# Patient Record
Sex: FEMALE | Race: WHITE | Employment: FULL TIME | URBAN - METROPOLITAN AREA
[De-identification: names, ages, dates, MRNs, and addresses within clinical notes are randomized per-mention and may not be internally consistent; named-entity substitution may affect disease eponyms.]

---

## 2020-03-19 ENCOUNTER — OFFICE VISIT (OUTPATIENT)
Dept: FAMILY MEDICINE CLINIC | Age: 25
End: 2020-03-19
Payer: COMMERCIAL

## 2020-03-19 VITALS
RESPIRATION RATE: 16 BRPM | HEIGHT: 65 IN | BODY MASS INDEX: 32.92 KG/M2 | WEIGHT: 197.6 LBS | DIASTOLIC BLOOD PRESSURE: 82 MMHG | TEMPERATURE: 97.8 F | SYSTOLIC BLOOD PRESSURE: 110 MMHG | HEART RATE: 90 BPM | OXYGEN SATURATION: 98 %

## 2020-03-19 PROCEDURE — 99214 OFFICE O/P EST MOD 30 MIN: CPT | Performed by: FAMILY MEDICINE

## 2020-03-19 RX ORDER — NORGESTIMATE AND ETHINYL ESTRADIOL 7DAYSX3 28
KIT ORAL
COMMUNITY
Start: 2020-02-10 | End: 2022-06-21 | Stop reason: ALTCHOICE

## 2020-03-19 ASSESSMENT — PATIENT HEALTH QUESTIONNAIRE - PHQ9
1. LITTLE INTEREST OR PLEASURE IN DOING THINGS: 0
SUM OF ALL RESPONSES TO PHQ QUESTIONS 1-9: 0
SUM OF ALL RESPONSES TO PHQ9 QUESTIONS 1 & 2: 0
SUM OF ALL RESPONSES TO PHQ QUESTIONS 1-9: 0
2. FEELING DOWN, DEPRESSED OR HOPELESS: 0

## 2020-03-19 NOTE — PROGRESS NOTES
CC: Antonino Quinones is a 27 y.o. yo female here for evaluation of the following medical concerns: New Patient ([facility]) and Thyroid Problem (Had labs done at [facility], and T3 was elevated)      HPI:    Fatigue: about 1 year of worsening fatigue and weight gain with no changes in diet. Initially felt to be related to OCP so was off for some time and noticed no change. Currently on alternative OCP. Did get labs done at ob/gyn and was found to have elevated total T3 and normal TSH. No new palpitations, temperature intolerance, hot flashes, skin / hair pattern changes, fatigue, mood swings. History reviewed. No pertinent past medical history. Past Surgical History:   Procedure Laterality Date    WISDOM TOOTH EXTRACTION       Family History   Problem Relation Age of Onset    Thyroid Disease Maternal Grandmother     Allergies Sister     Other Sister         benign tumor of knee     Social History     Tobacco Use    Smoking status: Former Smoker     Packs/day: 0.25     Years: 3.00     Pack years: 0.75     Last attempt to quit: 3/19/2018     Years since quittin.0    Smokeless tobacco: Never Used   Substance Use Topics    Alcohol use: Yes     Comment: occasionally    Drug use: Never     No Known Allergies  Prior to Admission medications    Medication Sig Start Date End Date Taking?  Authorizing Provider   TRI FEMYNOR 0.18/0.215/0.25 MG-35 MCG TABS TAKE 1 TABLET BY MOUTH EVERY DAY AS DIRECTED 2/10/20  Yes Historical Provider, MD       ROS:  General: no new fever, chills, night sweats, weight changes      Ophthalmic: no new vision changes  ENT: no new headaches, sinus problems, URI symptoms  Cardiovascular: no new chest pain or dyspnea on exertion, palpitations  Respiratory: no new cough, shortness of breath  Gastrointestinal: no new abdominal pain, change in bowel habits, or black or bloody stools  Genito-Urinary: no new dysuria, trouble voiding, or hematuria  Endocrine: no new hot flashes, malaise/lethargy, polydipsia/polyuria, skin changes, temperature intolerance and unexpected weight changes   Musculoskeletal: no new  joint pain, muscle pain  Hematological and Lymphatic: no new bleeding problems  Dermatological: no new rash and skin lesion changes  Neurological: no new TIA or stroke symptoms  Psychological: no current depression or anxiety  Allergy and Immunology: no new nasal congestion, postnasal drip and seasonal allergies    Vitals:  /82   Pulse 90   Temp 97.8 °F (36.6 °C)   Resp 16   Ht 5' 5.25\" (1.657 m)   Wt 197 lb 9.6 oz (89.6 kg)   LMP 03/07/2020   SpO2 98%   BMI 32.63 kg/m²   Wt Readings from Last 3 Encounters:   03/19/20 197 lb 9.6 oz (89.6 kg)       Physical Exam:  Constitutional - alert, well appearing, and in no distress  Eyes - pupils equal and reactive, extraocular eye movements intact, left eye normal, right eye normal, no conjunctivitis noted  ENT - right ear normal, left ear normal; nose normal and patent, no erythema, discharge; mouth/throat mucous membranes moist, pharynx normal without lesions  Neck - supple, no significant adenopathy; thyroid exam: thyroid is normal in size without nodules or tenderness  Respiratory- clear to auscultation, no wheezes, rales or rhonchi, symmetric air entry; no increased work of breathing  Cardiovascular - normal rate, regular rhythm, normal S1, S2, no murmurs, rubs, clicks or gallops;  Extremities - peripheral pulses normal, no pedal edema, no clubbing or cyanosis  Abdomen - soft, nontender, nondistended, no masses or organomegaly  Musculoskeletal - gait normal; no clubbing, cyanosis of extremities noted; no joint deformity or swelling noted; full active range of motion noted without pain  Skin - normal coloration and turgor, no rashes, no suspicious skin lesions noted  Neurological - alert, oriented; no obvious CN deficits, normal speech, no focal findings or movement disorder noted  Psychiatric - alert, oriented to person, place, and time, normal mood, behavior, speech, dress, motor activity, and thought processes, affect appropriate to mood    Labs:  Pertinent labs, imaging, other diagnostic data and other clinician documentation reviewed in electronic medical record. Assessment / Plan   Diagnosis Orders   1. Encounter to establish care     2. Fatigue, unspecified type  Lipid Panel    CBC Auto Differential    Comprehensive Metabolic Panel    Vitamin D 25 Hydroxy    TSH without Reflex    T3    T4, FREE    THYROID PEROXIDASE ANTIBODY    THYROTROPIN RECEPTOR ANTIBODY    THYROGLOBULIN    HIV-1 AND HIV-2 ANTIBODIES   3. Weight gain  Lipid Panel    CBC Auto Differential    Comprehensive Metabolic Panel    Vitamin D 25 Hydroxy    TSH without Reflex    T3    T4, FREE   4. Thyroid function test abnormal   · Recheck labs. If still abnormal will refer to endo for further evaluation. TSH without Reflex    T3    T4, FREE    THYROID PEROXIDASE ANTIBODY    THYROTROPIN RECEPTOR ANTIBODY    THYROGLOBULIN   5. Healthcare maintenance  HIV-1 AND HIV-2 ANTIBODIES   6. Need for vaccination  INFLUENZA, QUADV, 3 YRS AND OLDER, IM PF, PREFILL SYR OR SDV, 0.5ML (AFLURIA QUADV, PF)     RTO: Return in about 3 months (around 6/19/2020) for preventative visit. Pt to bring / sign for medical records.      An electronic signature was used to authenticate this note.  ---- Shelia Kevin MD on 3/19/2020 at 9:42 AM

## 2020-07-24 LAB
ALBUMIN SERPL-MCNC: NORMAL G/DL
ALP BLD-CCNC: NORMAL U/L
ALT SERPL-CCNC: NORMAL U/L
ANION GAP SERPL CALCULATED.3IONS-SCNC: NORMAL MMOL/L
AST SERPL-CCNC: NORMAL U/L
BASOPHILS ABSOLUTE: NORMAL
BASOPHILS RELATIVE PERCENT: NORMAL
BILIRUB SERPL-MCNC: NORMAL MG/DL
BUN BLDV-MCNC: NORMAL MG/DL
CALCIUM SERPL-MCNC: NORMAL MG/DL
CHLORIDE BLD-SCNC: NORMAL MMOL/L
CHOLESTEROL, TOTAL: ABNORMAL
CHOLESTEROL/HDL RATIO: ABNORMAL
CO2: NORMAL
CREAT SERPL-MCNC: NORMAL MG/DL
EOSINOPHILS ABSOLUTE: NORMAL
EOSINOPHILS RELATIVE PERCENT: NORMAL
GFR CALCULATED: NORMAL
GLUCOSE BLD-MCNC: 86 MG/DL
HCT VFR BLD CALC: 40.7 % (ref 36–46)
HDLC SERPL-MCNC: 45 MG/DL (ref 35–70)
HEMOGLOBIN: 13.5 G/DL (ref 12–16)
LDL CHOLESTEROL CALCULATED: 163 MG/DL (ref 0–160)
LYMPHOCYTES ABSOLUTE: NORMAL
LYMPHOCYTES RELATIVE PERCENT: NORMAL
MCH RBC QN AUTO: NORMAL PG
MCHC RBC AUTO-ENTMCNC: NORMAL G/DL
MCV RBC AUTO: NORMAL FL
MONOCYTES ABSOLUTE: NORMAL
MONOCYTES RELATIVE PERCENT: NORMAL
NEUTROPHILS ABSOLUTE: NORMAL
NEUTROPHILS RELATIVE PERCENT: NORMAL
NONHDLC SERPL-MCNC: ABNORMAL MG/DL
PDW BLD-RTO: NORMAL %
PLATELET # BLD: NORMAL 10*3/UL
PMV BLD AUTO: NORMAL FL
POTASSIUM SERPL-SCNC: NORMAL MMOL/L
RBC # BLD: NORMAL 10*6/UL
SODIUM BLD-SCNC: NORMAL MMOL/L
T3 TOTAL: NORMAL
T4 FREE: NORMAL
TOTAL PROTEIN: NORMAL
TRIGL SERPL-MCNC: ABNORMAL MG/DL
TSH SERPL DL<=0.05 MIU/L-ACNC: NORMAL M[IU]/L
VITAMIN D 25-HYDROXY: NORMAL
VITAMIN D2, 25 HYDROXY: NORMAL
VITAMIN D3,25 HYDROXY: NORMAL
VLDLC SERPL CALC-MCNC: ABNORMAL MG/DL
WBC # BLD: NORMAL 10*3/UL

## 2020-09-08 ENCOUNTER — VIRTUAL VISIT (OUTPATIENT)
Dept: FAMILY MEDICINE CLINIC | Age: 25
End: 2020-09-08
Payer: COMMERCIAL

## 2020-09-08 PROCEDURE — 99213 OFFICE O/P EST LOW 20 MIN: CPT | Performed by: FAMILY MEDICINE

## 2020-09-08 NOTE — PROGRESS NOTES
TELEHEALTH EVALUATION -- Audio/Visual (During BZFKW-01 public health emergency)    CC: Travis Haque is a 23 y.o. yo female has requested a/an video evaluation for the following acute medical concerns: Thyroid Problem and Results (bloodwork )      HPI:    Thyroid issue:  Initially complaining of fatigue  Labs done by ob/gyn found to have elevated total T3 and normal TSH. Since then had repeat labs  Again with elevated T3. See media. No new medications or cytomel. Again, no new symptoms like palpitations, temperature intolerance, hot flashes, skin / hair pattern changes, fatigue, mood swings      Prior to Admission medications    Medication Sig Start Date End Date Taking? Authorizing Provider   TRI FEMYNOR 0.18/0.215/0.25 MG-35 MCG TABS TAKE 1 TABLET BY MOUTH EVERY DAY AS DIRECTED 2/10/20  Yes Historical Provider, MD       Social hx:   Travis Haque  reports that she quit smoking about 2 years ago. She has a 0.75 pack-year smoking history. She has never used smokeless tobacco. She reports current alcohol use. She reports that she does not use drugs. I reviewed the patient's past past medical history, past surgical history, family history, social history, medications and allergies during this visit    Vitals / PE:  Due to this being a TeleHealth encounter (During CMQJB-16 public health emergency), evaluation of the following organ systems was limited: Vitals/Constitutional/EENT/Resp/CV/GI//MS/Neuro/Skin/Heme-Lymph-Imm.       Vital Signs: (As obtained by patient/caregiver or practitioner observation)  Blood pressure-  Heart rate-    Respiratory rate-    Temperature-  Pulse oximetry-   Wt Readings from Last 3 Encounters:   03/19/20 197 lb 9.6 oz (89.6 kg)       Constitutional - alert, well appearing, and in no distress  Eyes - extraocular eye movements intact, left eye normal, right eye normal, no conjunctivitis noted  Neck - symmetric, no obvious masses noted  Respiratory- no increased work of involved in call none. Time spent: Greater than Not billed by time    This visit was completed virtually using Doxy. me

## 2021-03-10 ENCOUNTER — OFFICE VISIT (OUTPATIENT)
Dept: ENDOCRINOLOGY | Age: 26
End: 2021-03-10
Payer: COMMERCIAL

## 2021-03-10 VITALS
HEART RATE: 105 BPM | BODY MASS INDEX: 34.02 KG/M2 | OXYGEN SATURATION: 97 % | TEMPERATURE: 97.6 F | DIASTOLIC BLOOD PRESSURE: 72 MMHG | WEIGHT: 206 LBS | SYSTOLIC BLOOD PRESSURE: 112 MMHG

## 2021-03-10 DIAGNOSIS — R94.6 ABNORMAL THYROID FUNCTION TEST: Primary | ICD-10-CM

## 2021-03-10 DIAGNOSIS — E55.9 VITAMIN D DEFICIENCY: ICD-10-CM

## 2021-03-10 PROCEDURE — 99204 OFFICE O/P NEW MOD 45 MIN: CPT | Performed by: INTERNAL MEDICINE

## 2021-03-10 NOTE — PROGRESS NOTES
[address] [place] [address] Department of Endocrinology Diabetes and Metabolism   [address] [facility] [address]   Phone: [phone]  Fax: [phone]    Date of Service: 3/10/2021    Primary Care Physician: Rui Forbes MD  Referring physician: Damari Cheema MD  Provider: Raul Baker MD    Reason for the visit:  Abnormal thyroid function test     History of [id number] Illness: The history is provided by the patient. No  was used. Accuracy of the patient data is excellent. Elizabeth Natarajan is a very pleasant 23 y.o. female seen today for evaluation abnormal thyroid function test     Annette Segura was found to have abnormal total T3 on thyrid labs done in 1/2021 9/2020  - TSH 1.12, Free T 1.12, T 3 243.48     The patient was on oral contraceptive pills at time of blood work. She stopped OCP in 1/2021     Patient denied any history of  palpitations, swelling in the area of the thyroid gland, weight loss, change in appetite, nervousness, anxiety, irritability, tremor, sweating, heat intolerance, changes in bowel habits, muscle weakness or difficulty sleeping. Patient also denied any h/o unexplained weight gain, new fatigue, increased sensitivity to cold, dry skin, brittle fingernails, brittle hair, facial swelling/puffiness, or depressed mood. PAST MEDICAL HISTORY   No past medical history on file. PAST SURGICAL HISTORY   Past Surgical History:   Procedure Laterality Date    WISDOM TOOTH EXTRACTION         SOCIAL HISTORY   Tobacco:   reports that she quit smoking about 2 years ago. She has a 0.75 pack-year smoking history. She has never used smokeless tobacco.  Alcohol:   reports current alcohol use. Drugs:   reports no history of drug use.     FAMILY HISTORY   Family History   Problem Relation Age of Onset    Thyroid Disease Maternal Grandmother     Allergies Sister     Other Sister         benign tumor of knee       ALLERGIES AND DRUG power normal. No tremors   Psych: normal mood, and affect    Review of Laboratory Data:  I have reviewed the following:  Lab Results   Component Value Date/Time    HGB 13.5 07/24/2020    HCT 40.7 07/24/2020      No results found for: NA, K, CO2, BUN, CREATININE, CALCIUM, LABGLOM, GFRAA   No results found for: TSH, T4FREE, U5LSBRS, FT3, S8NVYHS, TSI, TPOABS, THGAB  Lab Results   Component Value Date    GLUCOSE 86 07/24/2020     Lab Results   Component Value Date    HDL 45 07/24/2020    [address] 163 07/24/2020     No results found for: VITD25    ASSESSMENT & RECOMMENDATIONS   Annette Segura, a 23 y.o.-old female seen in for management of following issues      Abnormal thyroid function test   · Elevated Total T3 seen on previous labs was likely due to being on OCP   · Pt stopped OCP OCP in 1/2021  · Will check TSH, free T4 and free T3     vitD deficiency    · Continue vitD supplement     I personally reviewed external notes from PCP and other patient's care team providers, and personally interpreted labs associated with the above diagnosis. I also ordered labs to further assess and manage the above addressed medical conditions. Return in about 4 months (around 7/10/2021) for Abnormal thyroid function manny . The above issues were reviewed with the patient who understood and agreed with the plan. Thank you for allowing us to participate in the care of this patient. Please do not hesitate to contact us with any additional questions. Diagnosis Orders   1. Abnormal thyroid function test  TSH without Reflex    T4, Free    T3, FREE   2. Vitamin D deficiency         Katlin Bowles MD  Endocrinologist, [facility]   [address] [place] [id number]   Phone: [phone]  Fax: [phone]  -------------------------  An electronic signature was used to authenticate this note.  Inocencio Hampotn MD on 3/10/2021 at 7:13 PM

## 2022-06-21 ENCOUNTER — HOSPITAL ENCOUNTER (EMERGENCY)
Age: 27
Discharge: HOME | End: 2022-06-21
Attending: STUDENT IN AN ORGANIZED HEALTH CARE EDUCATION/TRAINING PROGRAM
Payer: COMMERCIAL

## 2022-06-21 ENCOUNTER — APPOINTMENT (OUTPATIENT)
Dept: CT IMAGING | Age: 27
End: 2022-06-21
Payer: COMMERCIAL

## 2022-06-21 VITALS
HEART RATE: 86 BPM | SYSTOLIC BLOOD PRESSURE: 112 MMHG | BODY MASS INDEX: 34.55 KG/M2 | RESPIRATION RATE: 16 BRPM | DIASTOLIC BLOOD PRESSURE: 65 MMHG | HEIGHT: 66 IN | TEMPERATURE: 97.2 F | OXYGEN SATURATION: 97 % | WEIGHT: 215 LBS

## 2022-06-21 DIAGNOSIS — M54.50 ACUTE LOW BACK PAIN WITHOUT SCIATICA, UNSPECIFIED BACK PAIN LATERALITY: Primary | ICD-10-CM

## 2022-06-21 DIAGNOSIS — M51.26 LUMBAR HERNIATED DISC: ICD-10-CM

## 2022-06-21 DIAGNOSIS — M48.00 SPINAL STENOSIS, UNSPECIFIED SPINAL REGION: ICD-10-CM

## 2022-06-21 LAB
BACTERIA: ABNORMAL /HPF
BILIRUBIN URINE: NEGATIVE
BLOOD, URINE: NEGATIVE
CLARITY: CLEAR
COLOR: YELLOW
EPITHELIAL CELLS, UA: ABNORMAL /HPF
GLUCOSE URINE: NEGATIVE MG/DL
HCG(URINE) PREGNANCY TEST: NEGATIVE
KETONES, URINE: NEGATIVE MG/DL
LEUKOCYTE ESTERASE, URINE: NEGATIVE
NITRITE, URINE: NEGATIVE
PH UA: 7.5 (ref 5–9)
PROTEIN UA: NEGATIVE MG/DL
RBC UA: ABNORMAL /HPF (ref 0–2)
SPECIFIC GRAVITY UA: 1.01 (ref 1–1.03)
UROBILINOGEN, URINE: 0.2 E.U./DL
WBC UA: ABNORMAL /HPF (ref 0–5)

## 2022-06-21 PROCEDURE — 81001 URINALYSIS AUTO W/SCOPE: CPT

## 2022-06-21 PROCEDURE — 72131 CT LUMBAR SPINE W/O DYE: CPT

## 2022-06-21 PROCEDURE — 6360000002 HC RX W HCPCS: Performed by: STUDENT IN AN ORGANIZED HEALTH CARE EDUCATION/TRAINING PROGRAM

## 2022-06-21 PROCEDURE — 81025 URINE PREGNANCY TEST: CPT

## 2022-06-21 PROCEDURE — 6370000000 HC RX 637 (ALT 250 FOR IP): Performed by: STUDENT IN AN ORGANIZED HEALTH CARE EDUCATION/TRAINING PROGRAM

## 2022-06-21 PROCEDURE — 96372 THER/PROPH/DIAG INJ SC/IM: CPT

## 2022-06-21 PROCEDURE — 99284 EMERGENCY DEPT VISIT MOD MDM: CPT

## 2022-06-21 RX ORDER — KETOROLAC TROMETHAMINE 30 MG/ML
15 INJECTION, SOLUTION INTRAMUSCULAR; INTRAVENOUS ONCE
Status: COMPLETED | OUTPATIENT
Start: 2022-06-21 | End: 2022-06-21

## 2022-06-21 RX ORDER — IBUPROFEN 600 MG/1
600 TABLET ORAL 3 TIMES DAILY PRN
Qty: 30 TABLET | Refills: 0 | Status: SHIPPED | OUTPATIENT
Start: 2022-06-21

## 2022-06-21 RX ORDER — LIDOCAINE 50 MG/G
1 PATCH TOPICAL DAILY
Qty: 10 PATCH | Refills: 0 | Status: SHIPPED | OUTPATIENT
Start: 2022-06-21 | End: 2022-07-01

## 2022-06-21 RX ORDER — ORPHENADRINE CITRATE 100 MG/1
100 TABLET, EXTENDED RELEASE ORAL 2 TIMES DAILY
Qty: 20 TABLET | Refills: 0 | Status: SHIPPED | OUTPATIENT
Start: 2022-06-21 | End: 2022-07-01

## 2022-06-21 RX ORDER — LIDOCAINE 4 G/G
1 PATCH TOPICAL ONCE
Status: DISCONTINUED | OUTPATIENT
Start: 2022-06-21 | End: 2022-06-21 | Stop reason: HOSPADM

## 2022-06-21 RX ORDER — ORPHENADRINE CITRATE 30 MG/ML
60 INJECTION INTRAMUSCULAR; INTRAVENOUS ONCE
Status: COMPLETED | OUTPATIENT
Start: 2022-06-21 | End: 2022-06-21

## 2022-06-21 RX ADMIN — KETOROLAC TROMETHAMINE 15 MG: 30 INJECTION, SOLUTION INTRAMUSCULAR; INTRAVENOUS at 18:26

## 2022-06-21 RX ADMIN — ORPHENADRINE CITRATE 60 MG: 30 INJECTION INTRAMUSCULAR; INTRAVENOUS at 18:28

## 2022-06-21 ASSESSMENT — PAIN DESCRIPTION - DESCRIPTORS
DESCRIPTORS: SHARP
DESCRIPTORS: ACHING;SHARP;STABBING
DESCRIPTORS: DISCOMFORT;DULL

## 2022-06-21 ASSESSMENT — PAIN DESCRIPTION - LOCATION
LOCATION: BACK

## 2022-06-21 ASSESSMENT — PAIN DESCRIPTION - ORIENTATION
ORIENTATION: LOWER;MID
ORIENTATION: LOWER

## 2022-06-21 ASSESSMENT — PAIN SCALES - GENERAL
PAINLEVEL_OUTOF10: 3
PAINLEVEL_OUTOF10: 3

## 2022-06-21 ASSESSMENT — PAIN - FUNCTIONAL ASSESSMENT: PAIN_FUNCTIONAL_ASSESSMENT: 0-10

## 2022-06-21 ASSESSMENT — PAIN DESCRIPTION - PAIN TYPE: TYPE: ACUTE PAIN

## 2022-06-21 NOTE — ED NOTES
Patient to bathroom to collect a urine sample.      [facility]  06/21/22 1886

## 2022-06-21 NOTE — ED PROVIDER NOTES
Department of Emergency Medicine   ED  Provider Note  Admit Date/RoomTime: 6/21/2022  5:33 PM  ED Room: 07/07          History of Present Illness:  6/21/22, Time: 6:14 PM EDT  Chief Complaint   Patient presents with    Back Pain     Lower back pain for a couple days getting worse       Janak Alphonso is a 27 y.o. female presenting to the ED for low back pain. The patient states that she has had low back tightness all week. This morning it seemed to worsen. Her pain was becoming so severe it did make her fall to the ground slowly. She is able to lower herself down. She denies any other trauma. Subsequently she is able to get up and ambulate. The pain is described as a tightness but there is intermittent stabbing. Is across the entire low back but left is slightly more significant than right. Bending and moving worsens it. Ibuprofen somewhat helps. She denies any numbness, tingling or weakness. No perirectal paresthesias, incontinence, or history of IV drug use. No fevers, chest pain, shortness of breath or abdominal pain. Patient denies vaginal discharge or concern for STDs. She has not had pain like this in the past.    Review of Systems:   Constitutional symptoms: Denies fever  Eyes: Denies eye pain  Ears, Nose, Mouth, Throat: Denies ear pain  Cardiovascular: Denies chest pain  Respiratory: Denies shortness of breath  Gastrointestinal: Denies blood per rectum  Genitourinary: Denies hematuria  Skin: Denies rashes  Neurological: Denies headache  Musculoskeletal: Positive for low back pain    --------------------------------------------- PAST HISTORY ---------------------------------------------  Past Medical History:  has no past medical history on file. Past Surgical History:  has a past surgical history that includes Ravia tooth extraction. Social History:  reports that she quit smoking about 4 years ago. She has a 0.75 pack-year smoking history.  She has never used smokeless tobacco. She reports current alcohol use. She reports that she does not use drugs. Family History: family history includes Allergies in her sister; Other in her sister; Thyroid Disease in her maternal grandmother. . Unless otherwise noted, family history is non contributory    The patients home medications have been reviewed. Allergies: Patient has no known allergies. I have reviewed the past medical history, past surgical history, social history, and family history    ---------------------------------------------------PHYSICAL EXAM--------------------------------------    General: Patient is conversational and appears mildly uncomfortable  Head: Normocephalic and atraumatic. Eyes: Sclera non-icteric and no conjunctival injection  ENT: Nasal and oral membranes moist  Neck: Trachea midline. No JVD  Respiratory: Lungs clear to auscultation bilaterally. Patient speaking in full sentences. Cardiovascular: Tachycardic but regular. No pedal edema. 2+ DP pulses  Gastrointestinal:  Abdomen is soft and nondistended. Bowel sounds present. There is no tenderness. There is no guarding or rebound. Genitourinary: No CVA tenderness  Musculoskeletal: No deformity. No step-offs or deformities of the spine. Patient does have midline tenderness of the lumbar region. No tenderness of the thoracic or cervical region. Pelvis stable. Patient able to flex and extend at the hips and knees. Plantarflexion dorsiflexion of the ankle symmetric. Able to wiggle her toes. Symmetric strength. No bony tenderness of the lower extremities. Negative straight leg raise  Skin: Skin warm and dry. No rashes. Neurologic: No gross motor deficits. No aphasia. Sensation intact to light touch. Symmetric strength  Psychiatric: Normal affect.    -------------------------------------------------- RESULTS -------------------------------------------------  I have personally reviewed all laboratory and imaging results for this patient.  Results are listed below. LABS: (Lab results interpreted by me)  Results for orders placed or performed during the hospital encounter of 06/21/22   Pregnancy, urine   Result Value Ref Range    HCG(Urine) Pregnancy Test NEGATIVE NEGATIVE   Urinalysis with Microscopic   Result Value Ref Range    Color, UA Yellow Straw/Yellow    Clarity, UA Clear Clear    Glucose, Ur Negative Negative mg/dL    Bilirubin Urine Negative Negative    Ketones, Urine Negative Negative mg/dL    Specific Gravity, UA 1.015 1.005 - 1.030    Blood, Urine Negative Negative    pH, UA 7.5 5.0 - 9.0    Protein, UA Negative Negative mg/dL    Urobilinogen, Urine 0.2 <2.0 E.U./dL    Nitrite, Urine Negative Negative    Leukocyte Esterase, Urine Negative Negative    WBC, UA 0-1 0 - 5 /HPF    RBC, UA NONE 0 - 2 /HPF    Epithelial Cells, UA RARE /HPF    Bacteria, UA RARE (A) None Seen /HPF   ,     RADIOLOGY:  Interpreted by Radiologist unless otherwise specified  CT LUMBAR SPINE WO CONTRAST   Final Result   No acute fractures. At L4-5, there is a central and right paracentral disc herniation with   effacement of thecal sac, neural foramina and 50% spinal stenosis. Mild central disc bulge at L5-S1. RECOMMENDATIONS:   Unavailable             ------------------------- NURSING NOTES AND VITALS REVIEWED ---------------------------   The nursing notes within the ED encounter and vital signs as below have been reviewed by myself  /65   Pulse 86   Temp 97.2 °F (36.2 °C) (Temporal)   Resp 16   Ht 5' 6\" (1.676 m)   Wt 215 lb (97.5 kg)   SpO2 97%   BMI 34.70 kg/m²     Oxygen Saturation Interpretation: Normal    The patients available past medical records and past encounters were reviewed.       ------------------------------ ED COURSE/MEDICAL DECISION MAKING----------------------  Medications   lidocaine 4 % external patch 1 patch (1 patch TransDERmal Patch Applied 6/21/22 5444)   orphenadrine (NORFLEX) injection 60 mg (60 mg IntraMUSCular Given 6/21/22 1828)   ketorolac (TORADOL) injection 15 mg (15 mg IntraMUSCular Given 6/21/22 1826)       Medical Decision Making: This is a 27 y.o. female presenting to the ED for low back pain. On initial presentation, the patient's vital signs are interpreted as hypertensive, afebrile and saturating well on room air. Patient is somewhat tachycardic which may be secondary to pain. Based on history and physical exam, we have a broad differential.  Patient's pain is described as musculoskeletal strain or muscle spasm. With the recent fall we cannot exclude bony pathology or fracture. She is distally neurovascularly intact. Has no infectious symptoms. Abdominal exam soft and nonsurgical.  Patient will be symptomatically treated with Norflex, lidocaine patch and Toradol once pregnancy test is negative. CT of the lumbar spine obtained. Urinalysis shows a contaminated sample but otherwise no evidence of infection. Pregnancy test negative. CAT scan shows no acute fracture but there is central and right paracentral disc herniation with effacement of the thecal sac, neuroforaminal stenosis and 50% stenosis. Disc bulging. This is interpreted radiology. On reevaluation, patient endorses improvement of her symptoms. Tachycardia resolved. We have discussed the findings. As the patient is distally neurovascularly intact we discussed close follow-up with neurosurgery. Patient may require physical therapy in the future. At this time she will be given prescriptions for ibuprofen, lidocaine patches and Norflex. Strict return precautions. Verbalized understanding and agreeable to the plan. This patient's ED course included: a personal history and physicial examination and re-evaluation prior to disposition    This patient has improved during their ED course. Consultations:  None    Oxygen Saturation Interpretation: 97 % on room air.   Normal    Counseling:   I have spoken with the patient and spouse/SO and discussed todays results, in addition to providing specific details for the plan of care and counseling regarding the diagnosis and prognosis. Questions are answered at this time and they are agreeable with the plan.     --------------------------------- IMPRESSION AND DISPOSITION ---------------------------------    IMPRESSION  1. Acute low back pain without sciatica, unspecified back pain laterality    2. Lumbar herniated disc    3. Spinal stenosis, unspecified spinal region        DISPOSITION  Disposition: Discharge to home  Patient condition is fair    IDr. Beatris, am the primary provider of record    NOTE: This report was transcribed using voice recognition software.  Every effort was made to ensure accuracy; however, inadvertent computerized transcription errors may be present        Beatris Lopez MD  06/21/22 2004

## 2022-06-23 ENCOUNTER — OFFICE VISIT (OUTPATIENT)
Dept: NEUROSURGERY | Age: 27
End: 2022-06-23
Payer: COMMERCIAL

## 2022-06-23 VITALS
OXYGEN SATURATION: 94 % | DIASTOLIC BLOOD PRESSURE: 87 MMHG | SYSTOLIC BLOOD PRESSURE: 119 MMHG | RESPIRATION RATE: 20 BRPM | TEMPERATURE: 98.1 F | WEIGHT: 215 LBS | HEIGHT: 66 IN | BODY MASS INDEX: 34.55 KG/M2 | HEART RATE: 102 BPM

## 2022-06-23 DIAGNOSIS — M54.50 ACUTE MIDLINE LOW BACK PAIN WITHOUT SCIATICA: Primary | ICD-10-CM

## 2022-06-23 PROCEDURE — 99203 OFFICE O/P NEW LOW 30 MIN: CPT | Performed by: PHYSICIAN ASSISTANT

## 2022-06-23 RX ORDER — METHYLPREDNISOLONE 4 MG/1
TABLET ORAL
Qty: 1 KIT | Refills: 0 | Status: SHIPPED | OUTPATIENT
Start: 2022-06-23 | End: 2022-06-29

## 2022-06-23 ASSESSMENT — ENCOUNTER SYMPTOMS
ABDOMINAL PAIN: 0
BACK PAIN: 1
TROUBLE SWALLOWING: 0
PHOTOPHOBIA: 0
SHORTNESS OF BREATH: 0

## 2022-06-23 NOTE — PROGRESS NOTES
Subjective:      Patient ID: Douglas Nicholson is a 27 y.o. female. Diana Martin is a 27year old female presenting to the office as a new patient c/o acute low back pain x2 days. Pt states she twisted too fast and her back \"locked up. \" The pain was initially sharp, throbbing, and a tightening pressure. Bending, lifting, and twisting makes the pain worse. Pain has progressively improved. Pt was seen in the ED 6/21/22 where CT scan was performed. She has tried lidocaine patches and ibuprofen with moderate relief. Denies recent PT or KATE. Denies loss of bowel or bladder, saddle anesthesia, pain down the legs, numbness, tingling, headache, loss of dexterity, abnormal gait, fever, chills, N/V, SOB, or chest pain. CT lumbar spine 6/21/22 demonstrates L4-5 central and right paracentral disc herniation with effacement of thecal sac, right lateral recess and the right neural foramina.  There is nearly 50% spinal stenosis. At L5-S1, there is mild central disc bulge. Review of Systems   Constitutional: Negative for fever and unexpected weight change. HENT: Negative for trouble swallowing. Eyes: Negative for photophobia and visual disturbance. Respiratory: Negative for shortness of breath. Cardiovascular: Negative for chest pain. Gastrointestinal: Negative for abdominal pain. Endocrine: Negative for heat intolerance. Genitourinary: Negative for flank pain. Musculoskeletal: Positive for back pain and myalgias. Negative for gait problem and neck pain. Skin: Negative for wound. Neurological: Negative for weakness, numbness and headaches. Psychiatric/Behavioral: Negative for confusion. Objective:   Physical Exam  Constitutional:       Appearance: Normal appearance. She is well-developed. HENT:      Head: Normocephalic and atraumatic. Eyes:      Extraocular Movements: Extraocular movements intact.       Conjunctiva/sclera: Conjunctivae normal.      Pupils: Pupils are equal, round, and reactive to light. Cardiovascular:      Rate and Rhythm: Normal rate. Pulmonary:      Effort: Pulmonary effort is normal.   Abdominal:      General: There is no distension. Musculoskeletal:      Cervical back: Normal range of motion and neck supple. Comments: Mild tenderness to palpation of midline lumbar spine   Skin:     General: Skin is warm and dry. Neurological:      Mental Status: She is alert. Comments: Alert and oriented x3  CN3-12 intact  Motor strength full  Sensation intact to light touch     Psychiatric:         Thought Content: Thought content normal.         Assessment: This is a 27year old female presenting for acute low back pain. Plan:      -Pt is not having any radicular symptoms and pain is improving.  No further imaging with MRI indicated at that this time.  -Referral for PT given  -Medrol dosepack sent to [address] [place] [address] report reviewed  -Return to neurosurgery clinic PRN  -Call/return sooner if symptoms worsen or new issues arise in the interim         Elke Ott PA-C

## 2023-02-08 ENCOUNTER — TELEPHONE (OUTPATIENT)
Dept: FAMILY MEDICINE CLINIC | Age: 28
End: 2023-02-08

## 2023-02-08 ENCOUNTER — OFFICE VISIT (OUTPATIENT)
Dept: FAMILY MEDICINE CLINIC | Age: 28
End: 2023-02-08
Payer: COMMERCIAL

## 2023-02-08 VITALS
BODY MASS INDEX: 37.54 KG/M2 | TEMPERATURE: 97.4 F | DIASTOLIC BLOOD PRESSURE: 64 MMHG | WEIGHT: 232.6 LBS | HEART RATE: 84 BPM | SYSTOLIC BLOOD PRESSURE: 116 MMHG | OXYGEN SATURATION: 98 %

## 2023-02-08 DIAGNOSIS — R59.0 POSTAURICULAR LYMPHADENOPATHY: ICD-10-CM

## 2023-02-08 DIAGNOSIS — Z00.00 ENCOUNTER FOR PREVENTIVE CARE: ICD-10-CM

## 2023-02-08 DIAGNOSIS — M51.26 LUMBAR HERNIATED DISC: Primary | ICD-10-CM

## 2023-02-08 DIAGNOSIS — E55.9 VITAMIN D DEFICIENCY, UNSPECIFIED: ICD-10-CM

## 2023-02-08 DIAGNOSIS — M48.061 SPINAL STENOSIS OF LUMBAR REGION WITHOUT NEUROGENIC CLAUDICATION: ICD-10-CM

## 2023-02-08 DIAGNOSIS — R94.6 ABNORMAL THYROID FUNCTION TEST: ICD-10-CM

## 2023-02-08 PROCEDURE — 99214 OFFICE O/P EST MOD 30 MIN: CPT | Performed by: FAMILY MEDICINE

## 2023-02-08 RX ORDER — AMOXICILLIN AND CLAVULANATE POTASSIUM 875; 125 MG/1; MG/1
1 TABLET, FILM COATED ORAL 2 TIMES DAILY WITH MEALS
Qty: 20 TABLET | Refills: 0 | Status: SHIPPED | OUTPATIENT
Start: 2023-02-08 | End: 2023-02-18

## 2023-02-08 SDOH — ECONOMIC STABILITY: FOOD INSECURITY: WITHIN THE PAST 12 MONTHS, THE FOOD YOU BOUGHT JUST DIDN'T LAST AND YOU DIDN'T HAVE MONEY TO GET MORE.: NEVER TRUE

## 2023-02-08 SDOH — ECONOMIC STABILITY: INCOME INSECURITY: HOW HARD IS IT FOR YOU TO PAY FOR THE VERY BASICS LIKE FOOD, HOUSING, MEDICAL CARE, AND HEATING?: NOT HARD AT ALL

## 2023-02-08 SDOH — ECONOMIC STABILITY: HOUSING INSECURITY
IN THE LAST 12 MONTHS, WAS THERE A TIME WHEN YOU DID NOT HAVE A STEADY PLACE TO SLEEP OR SLEPT IN A SHELTER (INCLUDING NOW)?: NO

## 2023-02-08 SDOH — ECONOMIC STABILITY: FOOD INSECURITY: WITHIN THE PAST 12 MONTHS, YOU WORRIED THAT YOUR FOOD WOULD RUN OUT BEFORE YOU GOT MONEY TO BUY MORE.: NEVER TRUE

## 2023-02-08 ASSESSMENT — PATIENT HEALTH QUESTIONNAIRE - PHQ9
1. LITTLE INTEREST OR PLEASURE IN DOING THINGS: 0
SUM OF ALL RESPONSES TO PHQ QUESTIONS 1-9: 0
SUM OF ALL RESPONSES TO PHQ QUESTIONS 1-9: 0
SUM OF ALL RESPONSES TO PHQ9 QUESTIONS 1 & 2: 0
2. FEELING DOWN, DEPRESSED OR HOPELESS: 0
SUM OF ALL RESPONSES TO PHQ QUESTIONS 1-9: 0
SUM OF ALL RESPONSES TO PHQ QUESTIONS 1-9: 0

## 2023-02-08 ASSESSMENT — LIFESTYLE VARIABLES
HOW MANY STANDARD DRINKS CONTAINING ALCOHOL DO YOU HAVE ON A TYPICAL DAY: 3 OR 4
HOW OFTEN DO YOU HAVE A DRINK CONTAINING ALCOHOL: MONTHLY OR LESS

## 2023-02-08 NOTE — TELEPHONE ENCOUNTER
Pt came back in after appt today and send Dr ALVAREZ said she would send in antibiotic for an ear infection, not in emr

## 2023-02-08 NOTE — PROGRESS NOTES
CC: Esther العراقي is a 24 y.o. yo female is here for evaluation evaluation for the following acute medical concerns: Back Pain Brianna Snow to [facility] ER in June, 2022 & had CT Scan; recent exacerbation in December, 2022.)      HPI:    Mass / growth near / around the L ear; gets small and big at times    Acute on chronic low back pain  She had twisting injury 6/2022; seen in ED; CT lumbar spine 6/21/22 demonstrates L4-5 central and right paracentral disc herniation with effacement of thecal sac, right lateral recess and the right neural foramina. There is nearly 50% spinal stenosis.  At L5-S1, there is mild central disc bulge  She had f/u with NS, PA and was not having radicular symptoms and pain improved; she had not additional symptoms  However, 12/2022 she had recurrent symptoms; she got stuck in a chair and could not get back up; today she is improved again and not having symptoms    Hx of abnormal thyroid studies; did have f/u with endo; I do not see that repeat labs were completed; abnormalities felt to be related to being on OCP which she is off at this time    Vitals:   /64 (Site: Left Upper Arm, Position: Sitting, Cuff Size: Large Adult)   Pulse 84   Temp 97.4 °F (36.3 °C) (Temporal)   Wt 232 lb 9.6 oz (105.5 kg)   LMP 01/25/2023 (Approximate)   SpO2 98%   BMI 37.54 kg/m²   Wt Readings from Last 3 Encounters:   02/08/23 232 lb 9.6 oz (105.5 kg)   06/23/22 215 lb (97.5 kg)   06/21/22 215 lb (97.5 kg)       PE:  Constitutional - alert, well appearing, and in no distress  Ears: post auricular node about 0.5 cm; freely moveable; TM mildly opacified b/l  Eyes - extraocular eye movements intact, left eye normal, right eye normal, no conjunctivitis noted  Neck - symmetric, no obvious masses noted  Respiratory- clear to auscultation, no wheezes, rales or rhonchi, symmetric air entry; no increased work of breathing  Cardiovascular - normal rate, regular rhythm, normal S1, S2, no murmurs, rubs, clicks or gallops  Extremities - no edema noted  Abdomen - soft, nontender, nondistended  Skin - normal coloration and turgor, no rashes, no suspicious skin lesions noted  Neurological - no obvious CN deficits or focal neurological deficits  Psychiatric - alert, oriented; normal mood, behavior, speech, dress    A / P:     Diagnosis Orders   1. Lumbar herniated disc  [facility]      2. Spinal stenosis of lumbar region without neurogenic claudication  [facility]      3. Abnormal thyroid function test  TSH    T4, Free    T3, Free      4. Vitamin D deficiency, unspecified  Vitamin D 25 Hydroxy      5. Encounter for preventive care  CBC with Auto Differential    Comprehensive Metabolic Panel    Lipid Panel    TSH    Urinalysis    HIV Screen    Hepatitis C Antibody      6. Postauricular lymphadenopathy  amoxicillin-clavulanate (AUGMENTIN) 875-125 MG per tablet          Augment to treat ears / lymph  Call if  not resolved or worsening; will order US as needed; close f/u  Start PT to strengthen core muscles  Call for new or worsening symptoms  Can call NS PRN for f/u as well  Future labs as ordered; re-eval thyroid studies        RTO: Return in about 4 months (around 6/8/2023) for Annual Preventive.       An electronic signature was used to authenticate this note.  ---- Salomón Kidd MD on 2/8/2023 at 5:44 PM

## 2023-02-10 ENCOUNTER — TELEPHONE (OUTPATIENT)
Dept: FAMILY MEDICINE CLINIC | Age: 28
End: 2023-02-10

## 2023-02-10 NOTE — TELEPHONE ENCOUNTER
Called pt per  stating dr called in medication and if pt is not better to call before her 4 month appt.

## 2023-03-08 ENCOUNTER — EVALUATION (OUTPATIENT)
Dept: PHYSICAL THERAPY | Age: 28
End: 2023-03-08
Payer: COMMERCIAL

## 2023-03-08 DIAGNOSIS — M51.26 LUMBAR HERNIATED DISC: Primary | ICD-10-CM

## 2023-03-08 DIAGNOSIS — M48.061 SPINAL STENOSIS OF LUMBAR REGION WITHOUT NEUROGENIC CLAUDICATION: ICD-10-CM

## 2023-03-08 PROCEDURE — 97110 THERAPEUTIC EXERCISES: CPT | Performed by: PHYSICAL THERAPIST

## 2023-03-08 PROCEDURE — 97161 PT EVAL LOW COMPLEX 20 MIN: CPT | Performed by: PHYSICAL THERAPIST

## 2023-03-08 NOTE — PROGRESS NOTES
[facility]   Phone: [phone]   Fax: [phone]           Date:  3/8/2023   Patient: Sue Galaviz  : 1995  MRN: [id number]  Referring Provider: Herminio Hahn MD  [address] [place], [place] [address],  [address] [place] [address]     Medical Diagnosis:      Diagnosis Orders   1. Lumbar herniated disc        2. Spinal stenosis of lumbar region without neurogenic claudication            SUBJECTIVE:     Onset date: 24    Onset: Sudden onset    Mechanism of Injury: Pt reports she developed back pain after slipping down 3-4 stairs. Pt reports episodes where her back has locked up or she has had difficulty getting out of a chair since that initial incident. Previous PT: none    Medical Management for Current Problem:  N/A    Chief complaint: pain, decreased mobility    Behavior: condition is staying the same    Pain: constant  Current: 1-2/10     Best: 1/10     Worst:9/10    Symptom Type/Quality: N/A  Location[de-identified] Back: lumbar region and midline over the spine does not radiate         Provoking Activities/Positions:  leaning forward                 Relieving Activitie/Positions: lying on back    Disturbed Sleep: yes  Bladder Dysfunction: no  Bowel Dysfunction: no     Imaging results: CT scan of lumbar spine 22:      Impression   No acute fractures. At L4-5, there is a central and right paracentral disc herniation with   effacement of thecal sac, neural foramina and 50% spinal stenosis. Mild central disc bulge at L5-S1. RECOMMENDATIONS:   Unavailable       Past Medical History:  No past medical history on file. Past Surgical History:   Procedure Laterality Date    WISDOM TOOTH EXTRACTION         Medications:   Current Outpatient Medications   Medication Sig Dispense Refill    ibuprofen (ADVIL;MOTRIN) 600 MG tablet Take 1 tablet by mouth 3 times daily as needed for Pain 30 tablet 0     No current facility-administered medications for this visit.        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 Status: full time    Exercise regimen: none    Hobbies: art    Patient Goals: pain relief    Contraindications/Precautions: none    OBJECTIVE:     Observations: well nourished female    Inspection: normal orthopedic exam         Gait: normal    Functional Strength: no deficits noted    Range of Motion:    Trunk: WFL except flex 90°     Lower Extremity:   Right:   [x] Normal   [] Limited    Left:   [x] Normal   [] Limited       Strength:     Trunk: 4+/5   R LE: 5/5   L LE: 5/5    Palpation: No tenderness     Sensation: intact to light touch and temperature. Special Tests:   [] Nerve Root Compression           Right []+ / [] -    Left []+ / [] -  [] Slump           Right []+ / [] -    Left []+ / [] -  [] FADIR          Right []+ / [] -    Left []+ / [] -  [] S-I Distraction          Right []+ / [] -    Left []+ / [] -     [x] SLR           Right []+ / [x] -    Left []+ / [x] -     [] MIRTHA          Right []+ / [] -    Left []+ / [] -  [] S-I Compression          Right []+ / [] -    Left []+ / [] -   [] Leg Length: []+ / [] -       Special Test Comments:     ASSESSMENT     Outcome Measure:   Modified Oswestry 16% disability    Problems:   Pain reported 1-9/10  ROM decreased   Strength decreased  Decreased functional ability with sitting tolerance, bending, sleep quality    Reason for Skilled Care: Pt s/p injury with recurrent back pain which is interfering with mobility and pt quality of life. [x] There are no barriers affecting plan of care or recovery    [] Barriers to this patient's plan of care or recovery include.     Domestic Concerns:  [x] No  [] Yes:    Long Term goals (4 weeks)  Decrease reported pain to 0-3/10  Increase ROM lumbar flex by 10°  Increase Strength of trunk to 5/5   Oswestry Low Back Disability Questionnaire 10% disability   Independent with Home Exercise Programs    Rehab Potential: [x] Good  [] Fair  [] Poor    PLAN       Treatment Plan: [x] Therapeutic Exercise  [x] Therapeutic Activity  [x] Neuromuscular Re-education   [] Gait Training  [] Balance Training  [] Aerobic conditioning  [x] Manual Therapy  [] Massage/Fascial release   [] Work/Sport specific activities    [] Pain Neuroscience [x] Cold/hotpack  [] Vasocompression  [x] Electrical Stimulation  [] Lumbar/Cervical Traction  [x] Ultrasound   [] Iontophoresis: 4 mg/mL Dexamethasone Sodium Phosphate 40-80 mAmin  [] Dry Needling      [x] Instruction in HEP      []  Medication allergies reviewed for use of Dexamethasone Sodium Phosphate 4mg/ml  with iontophoresis treatments. Patient is not allergic. The following CPT codes are likely to be used in the care of this patient: 22478 PT Evaluation: Low Complexity, 40492 PT Re-Evaluation, 36415 Therapeutic Exercise, 33103 Neuromuscular Re-Education, 17420 Therapeutic Activities, 99827 Manual Therapy, 42254 Electric Stimulation, and 04193 Ultrasound      Suggested Professional Referral: [x] No  [] Yes:     Patient Education:  [x] Plans/Goals, Risks/Benefits discussed  [x] Home exercise program  Method of Education: [x] Verbal  [x] Demo  [x] Written  Comprehension of Education:  [x] Verbalizes understanding. [x] Demonstrates understanding. [] Needs Review. [] Demonstrates/verbalizes understanding of HEP/Ed previously given. Frequency:  1-2 days per week for 4 weeks    Patient understands diagnosis/prognosis and consents to treatment, plan and goals: [x] Yes    [] No     Thank you for the opportunity to work with your patient. If you have questions or comments, please contact me at numbers listed above. Electronically signed by: Sujit Velázquez, [facility], [id number]    Medicare Patients Only     Please sign Physician's Certification and return to: [address]  [facility]  [address] [facility] [address]   [address] [facility] [address]  Dept: [phone]  Dept Fax: [phone]: [phone]     Physician's Certification / Comments     Frequency/Duration 1-2 days per week for 4 weeks. Certification period from 3/8/2023  to 6/7/23. I have reviewed the Plan of Care established for skilled therapy services and certify that the services are required and that they will be provided while the patient is under my care.     Physician's Comments/Revisions:               Physician's Printed Name:                                           [de-identified] Signature:                                                               Date:

## 2023-03-08 NOTE — PROGRESS NOTES
[facility]          Phone: [phone] Fax: [phone]    Physical Therapy Daily Treatment Note  Date:  3/8/2023    Patient Name:  Melissa Deras    :  1995  MRN: [id number]    Evaluating Therapist:  Sheron Benson, NEYDA [id number]    Restrictions/Precautions:    Diagnosis:     Diagnosis Orders   1. Lumbar herniated disc        2. Spinal stenosis of lumbar region without neurogenic claudication          Treatment Diagnosis:    Insurance/Certification information:  Generic Commercial   Referring Physician:  Carmella Barker MD  Plan of care signed (Y/N):    Visit# / total visits:    Pain level: 1-2/10   Time In:  0802  Time Out:  6144    Subjective:  See evaluation    Exercises:  Exercise/Equipment Resistance/Repetitions Other comments     Prone press-ups 2 sec x 10 reps      Prone prop       Hamstring stretch       Prone arm/leg lifts                                                                                                                   Other Therapeutic Activities:  PT evaluation completed. Pt educated in HEP and symptom monitoring with HEP. Pt verbalized understanding of HEP.     Home Exercise Program:  3/8/23 - prone press-ups    Manual Treatments:  N/A    Modalities:  N/A     Time-in Time-out Total Time   87759  Evaluation Low Complexity 0802 0825 23   50754  Evaluation Med Complexity      90298  Evaluation High Complexity      91095  Ther Ex 0825 0835 10   59256  Neuro Re-ed        87186  Ther Activities        11736  Manual Therapy       73612  E-stim       66816  Ultrasound            Session 0802 0835 33       Treatment/Activity Tolerance:  [x] Patient tolerated treatment well [] Patient limited by fatigue  [] Patient limited by pain  [] Patient limited by other medical complications  [] Other:     Prognosis: [x] Good [] Fair  [] Poor    Patient Requires Follow-up: [x] Yes  [] No    Plan:   [] Continue per plan of care [] Alter current plan (see comments)  [x] Plan of care initiated [] Hold pending MD visit [] Discharge  Plan for Next Session:        Electronically signed by:  Meri Tovar, [id number]

## 2023-03-20 ENCOUNTER — TREATMENT (OUTPATIENT)
Dept: PHYSICAL THERAPY | Age: 28
End: 2023-03-20
Payer: COMMERCIAL

## 2023-03-20 DIAGNOSIS — M51.26 LUMBAR HERNIATED DISC: Primary | ICD-10-CM

## 2023-03-20 DIAGNOSIS — M48.061 SPINAL STENOSIS OF LUMBAR REGION WITHOUT NEUROGENIC CLAUDICATION: ICD-10-CM

## 2023-03-20 PROCEDURE — 97110 THERAPEUTIC EXERCISES: CPT

## 2023-03-20 NOTE — PROGRESS NOTES
43008  Manual Therapy       01222  E-stim       47990  Ultrasound            Session 4926 60 31       Treatment/Activity Tolerance:  [x] Patient tolerated treatment well [] Patient limited by fatigue  [] Patient limited by pain  [] Patient limited by other medical complications  [] Other:     Prognosis: [x] Good [] Fair  [] Poor    Patient Requires Follow-up: [x] Yes  [] No    Plan:   [x] Continue per plan of care [] Alter current plan (see comments)  [] Plan of care initiated [] Hold pending MD visit [] Discharge  Plan for Next Session:        Electronically signed by:  Siddhartha Callahan, PTA [id number]

## 2023-03-22 ENCOUNTER — TREATMENT (OUTPATIENT)
Dept: PHYSICAL THERAPY | Age: 28
End: 2023-03-22
Payer: COMMERCIAL

## 2023-03-22 DIAGNOSIS — M48.061 SPINAL STENOSIS OF LUMBAR REGION WITHOUT NEUROGENIC CLAUDICATION: ICD-10-CM

## 2023-03-22 DIAGNOSIS — M51.26 LUMBAR HERNIATED DISC: Primary | ICD-10-CM

## 2023-03-22 PROCEDURE — 97110 THERAPEUTIC EXERCISES: CPT

## 2023-03-22 NOTE — PROGRESS NOTES
[facility]          Phone: [phone] Fax: [phone]    Physical Therapy Daily Treatment Note  Date:  3/22/2023    Patient Name:  Flavio Santana    :  1995  MRN: [id number]    Evaluating Therapist:  Shakira Montague PT [id number]    Restrictions/Precautions:    Diagnosis:     Diagnosis Orders   1. Lumbar herniated disc        2. Spinal stenosis of lumbar region without neurogenic claudication          Treatment Diagnosis:    Insurance/Certification information:  Generic Commercial   Referring Physician:  Tamiko Ac MD  Plan of care signed (Y/N):    Visit# / total visits:  3/8  Pain level: 0-4/10   Time In:  1258  Time Out:  1331    Subjective:  pt reported she has no pain in her back with most activities. Pain occurs 4/10 when she is transitioning from sit to stand. \" It feels like it locks up\" this is relieved by walking around . Pt has no issues with transition from standing to sitting. However on bad day , prolonged sitting causes pain in her LB. Exercises:  Exercise/Equipment Resistance/Repetitions Other comments     Prone press-ups 2 sec x 10 reps      Prone prop 3 mins 3/20 HEP     Hamstring stretch 30 sec x 2 reps  B  3/20 HEP     Prone arm/leg lifts X 10 reps , alternating      Bridging w/ TRA X 10 reps  3/20 HEP     Pelvic tilts  5 sec x 10 reps  3/20 HEP     SLR w/ TRA X 10 reps B       Quadriped leg ext w/ TRA X 10 reps alternating      Bike  X 10 mins End of session               Core stability on ball w/ TRA Marching x 10 reps                                                               Other Therapeutic Activities: pt tolerated progressions of TE. Reported pain 0/10 after session, and no LBP when standing from bike    Home Exercise Program:  3/8/23 - prone press-ups 3/20 HEP provided and reviewed with pt.   Pt demonstrated good technique to therapist.     Manual Treatments:  N/A    Modalities:  N/A     Time-in Time-out Total Time   33787

## 2023-03-27 ENCOUNTER — TREATMENT (OUTPATIENT)
Dept: PHYSICAL THERAPY | Age: 28
End: 2023-03-27

## 2023-04-19 ENCOUNTER — OFFICE VISIT (OUTPATIENT)
Dept: FAMILY MEDICINE CLINIC | Age: 28
End: 2023-04-19
Payer: COMMERCIAL

## 2023-04-19 VITALS
TEMPERATURE: 97.5 F | DIASTOLIC BLOOD PRESSURE: 66 MMHG | OXYGEN SATURATION: 97 % | WEIGHT: 231.6 LBS | HEART RATE: 90 BPM | BODY MASS INDEX: 37.38 KG/M2 | SYSTOLIC BLOOD PRESSURE: 108 MMHG

## 2023-04-19 DIAGNOSIS — H66.90 CHRONIC OTITIS MEDIA, UNSPECIFIED OTITIS MEDIA TYPE: Primary | ICD-10-CM

## 2023-04-19 DIAGNOSIS — R59.0 POSTAURICULAR LYMPHADENOPATHY: ICD-10-CM

## 2023-04-19 PROCEDURE — 99214 OFFICE O/P EST MOD 30 MIN: CPT | Performed by: FAMILY MEDICINE

## 2023-04-19 NOTE — PROGRESS NOTES
CC: Sherryle Lose is a 24 y.o. yo female is here for evaluation evaluation for the following acute medical concerns: Abnormal Lab (Abnormal thyroid function tests; follow-up/) and Other (Vitamin D Deficiency)        HPI:    Mass / growth near / around the R ear; gets small and big at times  Interval hx:  Treated for otitis media with augmentin; felt this did help her hearing in general but overall still can feel the lymph node and this concnering for her since it has been present for so long  It will changes at times getting bigger and smaller but it never goes away  No fever, chills, sweats or weight loss      Acute on chronic low back pain  She had twisting injury 6/2022; seen in ED; CT lumbar spine 6/21/22 demonstrates L4-5 central and right paracentral disc herniation with effacement of thecal sac, right lateral recess and the right neural foramina. There is nearly 50% spinal stenosis.  At L5-S1, there is mild central disc bulge  She had f/u with NS, PA and was not having radicular symptoms and pain improved; she had not additional symptoms  However, 12/2022 she had recurrent symptoms; she got stuck in a chair and could not get back up; today she is improved again and not having symptoms  Interval hx:  Resolving with PT and home exercises     Hx of abnormal thyroid studies; did have f/u with endo; I do not see that repeat labs were completed; abnormalities felt to be related to being on OCP which she is off at this time  Interval hx:  Planning repeat labs prior to next office visit    Vitals:   /66   Pulse 90   Temp 97.5 °F (36.4 °C) (Temporal)   Wt 231 lb 9.6 oz (105.1 kg)   SpO2 97%   BMI 37.38 kg/m²   Wt Readings from Last 3 Encounters:   04/19/23 231 lb 9.6 oz (105.1 kg)   02/08/23 232 lb 9.6 oz (105.5 kg)   06/23/22 215 lb (97.5 kg)       PE:  Constitutional - alert, well appearing, and in no distress  Ears: R post auricular node about 0.5 cm; freely moveable; TM mildly opacified b/l

## 2023-05-12 NOTE — PROGRESS NOTES
Date:  5/12/2023          Dear Dr. Kai Bell: This is to inform you that, as per Sharri Hayden, your patient Anastasia Bautista is as of todays date being discharged from Physical Therapy secondary to the following reasons:      If a client is absent for 50% of scheduled visits during a one-month period, he or she will be discharged from physical therapy services. A new prescription will be necessary to resume physical therapy. If you have any questions, please feel free to call at [phone]      Thank you          Silvestre Greene, [address] [place] [address], [phone]    The information contained in this Fax the designated recipients intend message only for the personal and confidential use named above. This information is to be handled as privileged and confidential.  If the reader of this message is not the intended recipient, you are hereby notified that you have received this document in error and that any review, dissemination, distribution or copying of this message is strictly prohibited. If you receive this communication in error, please notify us immediately at the above number and return the original to us by mail.   Thank you      [facility]   Makayla Rivera, [address] [address]

## 2024-02-27 ENCOUNTER — TELEPHONE (OUTPATIENT)
Dept: FAMILY MEDICINE CLINIC | Age: 29
End: 2024-02-27

## 2024-02-27 DIAGNOSIS — H66.90 CHRONIC OTITIS MEDIA, UNSPECIFIED OTITIS MEDIA TYPE: Primary | ICD-10-CM

## 2024-02-27 NOTE — TELEPHONE ENCOUNTER
Left message requesting patient to call the office to get clarification on need for referral and to whom-I believe Dr Mtz but want to clarify

## 2024-02-27 NOTE — TELEPHONE ENCOUNTER
----- Message from Alicia Hannon sent at 2/27/2024  2:01 PM EST -----  Subject: Referral Request    Reason for referral request? growth behind my ear and throat.   Provider patient wants to be referred to(if known):     Provider Phone Number(if known):    Additional Information for Provider? Ms. Segura called to get a referral   to the ENT from last year. This is for a growth. It seems to be the same   in size but she feels it is now to get the growth looked at.   ---------------------------------------------------------------------------  --------------  CALL BACK INFO    [phone]; OK to leave message on voicemail  ---------------------------------------------------------------------------  --------------